# Patient Record
Sex: MALE | Race: WHITE | NOT HISPANIC OR LATINO | URBAN - METROPOLITAN AREA
[De-identification: names, ages, dates, MRNs, and addresses within clinical notes are randomized per-mention and may not be internally consistent; named-entity substitution may affect disease eponyms.]

---

## 2024-10-01 ENCOUNTER — EMERGENCY (EMERGENCY)
Facility: HOSPITAL | Age: 39
LOS: 1 days | Discharge: HOME | End: 2024-10-01
Attending: EMERGENCY MEDICINE | Admitting: PERSONAL EMERGENCY RESPONSE ATTENDANT
Payer: MEDICAID

## 2024-10-01 VITALS
HEART RATE: 90 BPM | RESPIRATION RATE: 17 BRPM | SYSTOLIC BLOOD PRESSURE: 122 MMHG | TEMPERATURE: 98 F | OXYGEN SATURATION: 100 % | DIASTOLIC BLOOD PRESSURE: 84 MMHG

## 2024-10-01 VITALS — WEIGHT: 163.14 LBS

## 2024-10-01 PROCEDURE — 99284 EMERGENCY DEPT VISIT MOD MDM: CPT

## 2024-10-01 RX ORDER — RABIES IMMUNE GLOBULIN (HUMAN) 300 [IU]/ML
1500 INJECTION, SOLUTION INFILTRATION; INTRAMUSCULAR ONCE
Refills: 0 | Status: ACTIVE | OUTPATIENT
Start: 2024-10-01 | End: 2024-10-01

## 2024-10-01 RX ORDER — TETANUS TOXOID, REDUCED DIPHTHERIA TOXOID AND ACELLULAR PERTUSSIS VACCINE, ADSORBED 5; 2.5; 8; 8; 2.5 [IU]/.5ML; [IU]/.5ML; UG/.5ML; UG/.5ML; UG/.5ML
0.5 SUSPENSION INTRAMUSCULAR ONCE
Refills: 0 | Status: COMPLETED | OUTPATIENT
Start: 2024-10-01 | End: 2024-10-01

## 2024-10-01 RX ORDER — RABIES VIRUS STRAIN PM-1503-3M ANTIGEN (PROPIOLACTONE INACTIVATED) AND WATER 2.5 UNIT
1 KIT INTRAMUSCULAR ONCE
Refills: 0 | Status: DISCONTINUED | OUTPATIENT
Start: 2024-10-01 | End: 2024-10-01

## 2024-10-01 RX ORDER — RABIES IMMUNE GLOBULIN (HUMAN) 300 [IU]/ML
1500 INJECTION, SOLUTION INFILTRATION; INTRAMUSCULAR ONCE
Refills: 0 | Status: DISCONTINUED | OUTPATIENT
Start: 2024-10-01 | End: 2024-10-01

## 2024-10-01 RX ORDER — RABIES VIRUS STRAIN PM-1503-3M ANTIGEN (PROPIOLACTONE INACTIVATED) AND WATER 2.5 UNIT
1 KIT INTRAMUSCULAR ONCE
Refills: 0 | Status: COMPLETED | OUTPATIENT
Start: 2024-10-01 | End: 2024-10-01

## 2024-10-01 RX ADMIN — TETANUS TOXOID, REDUCED DIPHTHERIA TOXOID AND ACELLULAR PERTUSSIS VACCINE, ADSORBED 0.5 MILLILITER(S): 5; 2.5; 8; 8; 2.5 SUSPENSION INTRAMUSCULAR at 12:32

## 2024-10-01 RX ADMIN — RABIES VIRUS STRAIN PM-1503-3M ANTIGEN (PROPIOLACTONE INACTIVATED) AND WATER 1 MILLILITER(S): KIT at 12:31

## 2024-10-01 NOTE — ED PROVIDER NOTE - PHYSICAL EXAMINATION
VITAL SIGNS: I have reviewed nursing notes and confirm.  CONSTITUTIONAL:  in no acute distress.  SKIN: Skin exam is warm and dry, no acute rash.  RESP: No wheezes,  no rales or rhonchi.   EXT: (+) Superficial bite to R fifth digit, intact radial and ulnar pulses. <2s capillary refill. Strength and sensation intact with full ROM. No nail bed injury. No signs of infection, active drainage or bleeding.

## 2024-10-01 NOTE — ED ADULT NURSE NOTE - OBJECTIVE STATEMENT
Pt received to intake 16    , awake and alert, A&OX4, ambulatory. C/o dog bite to the right pinky. pt pinky noted to have small bite marks , no swelling or redness.  Respirations even and unlabored. Denies CP, SOB, N/V, HA, dizziness, palpitations, blurry vision. Bed in lowest position, call bell within reach. Safety maintained.

## 2024-10-01 NOTE — ED PROVIDER NOTE - OBJECTIVE STATEMENT
Patient is a 38y Male w/ PMHx of cholecystectomy & recent arrival from [place] 3mos ago who presents to ED w/ complaints of dog bite. Patient stated on Sunday he approached a stray dog, unknown origin, to pet it when it bit his R fifth digit (pinky). Endorses full ROM. Denies fever, chills, pain at site of injury, pus drainage bleeding, numbness/tingling.

## 2024-10-01 NOTE — ED PROVIDER NOTE - NSFOLLOWUPINSTRUCTIONS_ED_ALL_ED_FT
You were seen in the emergency department for animal bite . We have evaluated you and determined that you do not require further hospital interventions.    Please follow up with your primary care provider as necessary to discuss the results of your stay in our department.    If you start to experience worsening symptoms such as fever, chills, pain at site of bite , please return to the emergency department for further evaluation.    Animal Bite, Adult  Animal bites can be mild or serious. Small bites from house pets normally are mild. Bites from cats, strays, or wild animals can be serious. If a stray or wild animal bites you, you need to get medical help right away. You may also need a shot to prevent rabies infection.    What increases the risk?  Being near pets you do not know.  Being near animals that are eating, sleeping, or caring for their babies.  Being outside where small, wild animals move freely.  What are the signs or symptoms?  Pain.  Bleeding.  Swelling.  Bruising.  How is this treated?  Treatment may include:  Cleaning your wound.  Rinsing out (flushing) your wound. This uses saline solution, which is made of salt and water.  Putting a bandage on your wound.  Closing your wound with stitches (sutures), staples, skin glue, or skin tape (adhesive strips).  Antibiotic medicine. You may be given pills, cream, gel, or fluid through an IV.  A tetanus shot.  Rabies treatment, if the animal could have rabies.  Surgery, if there is infection or damage that needs to be fixed.  Follow these instructions at home:  Medicines    Take or apply over-the-counter and prescription medicines only as told by your doctor.  If you were prescribed an antibiotic medicine, take or apply it as told by your doctor. Do not stop using it even if your wound gets better.  Wound care    Two stitched wounds. One is normal. The other is red with pus and infected.  Follow instructions from your doctor about how to take care of your wound. Make sure you:  Wash your hands with soap and water for at least 20 seconds before and after you change your bandage. If you cannot use soap and water, use hand .  Change your bandage.  Leave stitches or skin glue in place for at least 2 weeks.  Leave tape strips alone unless you are told to take them off. You may trim the edges of the tape strips if they curl up.  Check your wound every day for signs of infection. Check for:  More redness, swelling, or pain.  More fluid or blood.  Warmth.  Pus or a bad smell.  General instructions    Bag of ice on a towel on the skin.   Raise (elevate) the injured area above the level of your heart while you are sitting or lying down.  If told, put ice on the injured area. To do this:  Put ice in a plastic bag.  Place a towel between your skin and the bag.  Leave the ice on for 20 minutes, 2–3 times per day.  Take off the ice if your skin turns bright red. This is very important. If you cannot feel pain, heat, or cold, you have a greater risk of damage to the area.  Keep all follow-up visits.  Contact a doctor if:  You have more redness, swelling, or pain around your wound.  Your wound feels warm to the touch.  You have a fever or chills.  You have a general feeling of sickness (malaise).  You feel like you may vomit.  You vomit.  You have pain that does not get better.  Get help right away if:  You have a red streak going away from your wound.  You have any of these coming from your wound:  Non-clear fluid.  More blood.  Pus or a bad smell.  You have trouble moving your injured area.  You lose feeling (have numbness) or feel tingling anywhere on your body.  Summary  Animal bites can be mild or serious.  If a stray or wild animal bites you, you need to get medical help right away.  Your doctor will look at the wound and may ask about how the animal bite happened.  Treatment may include wound care, antibiotic medicine, a tetanus shot, and rabies treatment.  This information is not intended to replace advice given to you by your health care provider. Make sure you discuss any questions you have with your health care provider.

## 2024-10-01 NOTE — ED PROVIDER NOTE - PROGRESS NOTE DETAILS
[place]ter, DO PGY-1  Patient comfortable, requesting abx. Will send Augmentin to pharmacy. Injected rabies immunoglobulin at site of bite (R fifth digit). Tolerated well. [place] Trotter, DO PGY-1  Patient comfortable, requesting abx. Will send Augmentin to pharmacy- called in to [facility] [phone] [facility] (unable to send electronically). . Injected rabies immunoglobulin at site of bite (R fifth digit). Tolerated well.

## 2024-10-01 NOTE — ED ADULT NURSE NOTE - CHIEF COMPLAINT QUOTE
Pt got bit by a stray dog two days ago to the right pinky finger. Pt states he washed the site with soap and water.  Wound noted to be healing well with no swelling, warmth or tenderness. Pt states he needs a rabies shot. Denies a medical history and use of medications.

## 2024-10-01 NOTE — ED PROVIDER NOTE - CLINICAL SUMMARY MEDICAL DECISION MAKING FREE TEXT BOX
Patient is a 38y Male w/ PMHx of cholecystectomy & recent arrival from [place] 3mos ago who presents to ED w/ complaints of dog bite. Patient stated on Sunday he approached a stray dog, unknown origin, to pet it when it bit his R fifth digit (pinky). Endorses full ROM. Denies fever, chills, pain at site of injury, pus drainage bleeding, numbness/tingling.   PE remarkable for well appearing patient in no acute distress. (+) Superficial bite to R fifth digit, intact radial and ulnar pulses. <2s capillary refill. Strength and sensation intact with full ROM. No nail bed injury. No signs of infection, active drainage or bleeding.     Will order Rabies vaccine, rabies immunoglobulin, and Tdap vaccine. 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 Patient stated on Sunday he approached a stray dog, unknown origin, to pet it when it bit his R fifth digit (pinky). Endorses full ROM. Denies fever, chills, pain at site of injury, pus drainage bleeding, numbness/tingling.   PE remarkable for well appearing patient in no acute distress. (+) Superficial bite to R fifth digit, intact radial and ulnar pulses. <2s capillary refill. Strength and sensation intact with full ROM. No nail bed injury. No signs of infection, active drainage or bleeding.     Will order Rabies vaccine, rabies immunoglobulin, and Tdap vaccine.

## 2024-10-01 NOTE — ED PROVIDER NOTE - PATIENT PORTAL LINK FT
You can access the FollowMyHealth Patient Portal offered by [facility] by registering at the following website: [email or web address]. By joining [employer]’s FollowMyHealth portal, you will also be able to view your health information using other applications (apps) compatible with our system.

## 2024-10-04 ENCOUNTER — EMERGENCY (EMERGENCY)
Facility: HOSPITAL | Age: 39
LOS: 1 days | Discharge: HOME | End: 2024-10-04
Attending: EMERGENCY MEDICINE | Admitting: EMERGENCY MEDICINE
Payer: MEDICAID

## 2024-10-04 VITALS
OXYGEN SATURATION: 98 % | HEIGHT: 68.5 IN | WEIGHT: 163.14 LBS | HEART RATE: 102 BPM | TEMPERATURE: 98 F | RESPIRATION RATE: 18 BRPM | DIASTOLIC BLOOD PRESSURE: 65 MMHG | SYSTOLIC BLOOD PRESSURE: 107 MMHG

## 2024-10-04 PROCEDURE — L9995: CPT

## 2024-10-04 RX ORDER — RABIES VACC, HUMAN DIPLOID/PF 2.5 UNIT
1 VIAL (EA) INTRAMUSCULAR ONCE
Refills: 0 | Status: COMPLETED | OUTPATIENT
Start: 2024-10-04 | End: 2024-10-04

## 2024-10-04 RX ADMIN — Medication 1 MILLILITER(S): at 10:58

## 2024-10-04 NOTE — ED PROVIDER NOTE - OBJECTIVE STATEMENT
38-year-old male with no significant past medical history.  Patient presents ED after dog bite on right hand 3 days ago.  Patient was petting a stray dog when it bit him.  Patient has no history of rabies immunization and is unsure of dog's vaccination status.  Dog was not able to be caught.  Patient came to the ED and had immunoglobulin and vaccine and antibiotics.  Patient is currently still taking the antibiotic.  Patient notes that wound is healing well.  Patient is here for second dose of rabies vaccine.  Patient denies any pus redness swelling or pain at the injury site.

## 2024-10-04 NOTE — ED PROVIDER NOTE - PHYSICAL EXAMINATION
Right hand fifth digit with healing wound on the ulnar side of proximal phalanx.  Patient has full range of motion and no tenderness or erythema.

## 2024-10-04 NOTE — ED PROVIDER NOTE - CLINICAL SUMMARY MEDICAL DECISION MAKING FREE TEXT BOX
38-year-old male 4 days status post dog bite.  Patient here for rabies vaccine #2 will need to return for day 7 and day 14 dose.  Wound appears to be healing well.

## 2024-10-04 NOTE — ED PROVIDER NOTE - PATIENT PORTAL LINK FT
You can access the FollowMyHealth Patient Portal offered by [facility] by registering at the following website: [email or web address]. By joining [employer]’s FollowMyHealth portal, you will also be able to view your health information using other applications (apps) compatible with our system.

## 2024-10-04 NOTE — ED PROVIDER NOTE - NSFOLLOWUPINSTRUCTIONS_ED_ALL_ED_FT
Follow up with your PMD within 48-72 hrs.  Take all of your medications as previously prescribed.  Worsening, continued or ANY new concerning symptoms return to the Emergency Department.     Return to Emergency Dept. for additional Rabies Vaccines on day: 7 (10/8),14 (10/15)    Rabies Vaccine: What You Need to Know  1. Why get vaccinated?  Rabies vaccine can prevent rabies.  Rabies is mainly a disease of animals. Humans get rabies when they are bitten or scratched by infected animals.  Human rabies is rare in the [place]. Wild animals like bats, raccoons, skunks, and foxes are the most common source of human rabies infection in the [place].Rabies is more common in other parts of the world where dogs still carry rabies. Most rabies deaths in people around the world are caused by bites from unvaccinated dogs.Rabies infects the central nervous system. After infection with rabies, at first there might not be any symptoms. Weeks or even months after a bite, rabies can cause general weakness or discomfort, fever, or headache. As the disease progresses, the person may experience delirium, abnormal behavior, hallucinations, hydrophobia (fear of water), and insomnia.  If a person does not receive appropriate medical care after an exposure, human rabies is almost always fatal.  Rabies can be prevented by vaccinating pets, staying away from wildlife, and seeking medical care after potential exposures and before symptoms start.  2. Rabies vaccine  Rabies vaccine is given to people at high risk of rabies to protect them if they are exposed. People at high risk of exposure to rabies should be offered pre-exposure rabies vaccination, including:  Veterinarians, animal handlers, and veterinary students Rabies laboratory workers [employer] (people who explore caves), and Persons who work with live vaccine to produce rabies vaccine and rabies immune globulin.Pre-exposure rabies vaccination should also be considered for:  People whose activities bring them into frequent contact with rabies virus or with possibly rabid animals. International travelers who are likely to come in contact with animals in parts of the world where rabies is common and immediate access to appropriate care is limited.For pre-exposure protection, 3 doses of rabies vaccine are recommended. People who may be repeatedly exposed to rabies virus should receive periodic testing for immunity, and booster doses might be necessary. Your health care provider can give you more details.  Rabies vaccine can prevent rabies if given to a person after they have had an exposure. Anyone who has been bitten by an animal suspected to have rabies, or who otherwise may have been exposed to rabies, should clean the wound and see a health care provider immediately regardless of vaccination status. The health care provider can help determine if the person should receive post-exposure rabies vaccination.  For post-exposure protection:  A person who is exposed and has never been vaccinated against rabies should get 4 doses of rabies vaccine. The person should also get another shot called rabies immune globulin (RIG). A person who has been previously vaccinated should get 2 doses of rabies vaccine and does not need Rabies Immune Globulin.Your health care provider can give you more information.  3. Talk with your health care provider  Tell your vaccine provider if the person getting the vaccine:   Has had an allergic reaction after a previous dose of rabies vaccine, or has any severe, life-threatening allergies.Has a weakened immune system.In some cases, your health care provider may decide to postpone a routine (non-exposure) dose of rabies vaccination to a future visit.   People with minor illnesses, such as a cold, may be vaccinated. People who are moderately or severely ill should usually wait until they recover before getting a routine (non-exposure) dose of rabies vaccine. If you have been exposed to rabies virus, you should get vaccinated regardless of concurrent illnesses, pregnancy, or breastfeeding.  Your health care provider can give you more information.  4. Risks of a vaccine reaction  Soreness, redness, swelling, or itching at the site of the injection, and headache, nausea, abdominal pain, muscle aches, or dizziness can happen after rabies vaccine. Hives, pain in the joints, or fever sometimes happen after booster doses. Very rarely, nervous system disorders such as Guillain–Barré syndrome (GBS) have been reported after rabies vaccine.People sometimes faint after medical procedures, including vaccination. Tell your provider if you feel dizzy or have vision changes or ringing in the ears.  As with any medicine, there is a very remote chance of a vaccine causing a severe allergic reaction, other serious injury, or death.  5. What if there is a serious problem?  An allergic reaction could occur after the vaccinated person leaves the clinic. If you see signs of a severe allergic reaction (hives, swelling of the face and throat, difficulty breathing, a fast heartbeat, dizziness, or weakness), call 9-1-1 and get the person to the nearest hospital.  For other signs that concern you, call your health care provider.   Adverse reactions should be reported to the Vaccine Adverse Event Reporting System (VAERS). Your health care provider will usually file this report, or you can do it yourself. Visit the VAERS website at www.vaers.[facility].gov or call 1-791.853.6862. VAERS is only for reporting reactions, and VAERS staff do not give medical advice.  6. How can I learn more?  Ask your health care provider. Call your local or state health department. Contact the Centers for Disease Control and Prevention (CDC):  Call 1-755.159.3908 (0-076-QPD-INFO) orVisit CDC's rabies website at www.cdc.gov/rabiesVaccine Information Statement Rabies Vaccine (01/08/2020)  This information is not intended to replace advice given to you by your health care provider. Make sure you discuss any questions you have with your health care provider.

## 2024-10-04 NOTE — ED ADULT NURSE NOTE - OBJECTIVE STATEMENT
pt arrives to intake for 2nd rabies vaccine. pt is AxO 3 and ambulatory. pt respirations even and unlabored. pt denies headaches, dizziness, n/v/d, abdominal pain, SOB, chest pain, or fever like symptoms. pt medicated as prescribed. Pt refusing to stay for post vaccine monitoring. D/C paper work given by MD zeng.

## 2024-10-08 ENCOUNTER — EMERGENCY (EMERGENCY)
Facility: HOSPITAL | Age: 39
LOS: 1 days | Discharge: HOME | End: 2024-10-08
Attending: EMERGENCY MEDICINE | Admitting: EMERGENCY MEDICINE
Payer: MEDICAID

## 2024-10-08 VITALS
TEMPERATURE: 98 F | HEIGHT: 68.5 IN | SYSTOLIC BLOOD PRESSURE: 109 MMHG | DIASTOLIC BLOOD PRESSURE: 73 MMHG | RESPIRATION RATE: 18 BRPM | WEIGHT: 163.14 LBS | HEART RATE: 72 BPM | OXYGEN SATURATION: 98 %

## 2024-10-08 PROCEDURE — L9995: CPT

## 2024-10-08 RX ORDER — RABIES VACC, HUMAN DIPLOID/PF 2.5 UNIT
1 VIAL (EA) INTRAMUSCULAR ONCE
Refills: 0 | Status: COMPLETED | OUTPATIENT
Start: 2024-10-08 | End: 2024-10-08

## 2024-10-08 RX ADMIN — Medication 1 MILLILITER(S): at 19:37

## 2024-10-08 NOTE — ED PROVIDER NOTE - CLINICAL SUMMARY MEDICAL DECISION MAKING FREE TEXT BOX
Patient here to complete series post d/c course uneventful previous note reviewed will d/c to complete series

## 2024-10-08 NOTE — ED PROVIDER NOTE - NSFOLLOWUPINSTRUCTIONS_ED_ALL_ED_FT
Rabies Vaccine    AMBULATORY CARE:    After exposure to the virus, the vaccine is usually given in 2 or 4 doses:  A person who has not already had the vaccine will usually get 4 doses. The first dose is given as soon after exposure to rabies as possible. A shot called rabies immune globulin is given at the same time as the first dose. This medicine helps your immune system fight the infection.     The other doses are given on     Day  3: __________________________    Day 7: __________________________    Day 14: _________________________    You may also need a dose 28 days ____________________________________after the exposure if you have a weak immune system.   BRING THIS DISCHARGE SHEET WITH YOU TO KEEP TRACK OF YOUR VACCINATIONS    Your healthcare provider will tell you if you need 4 or 5 doses.    The rabies vaccine an injection given to help prevent rabies. Rabies is a disease that affects the body's central nervous system (brain, spinal cord, and nerves). Rabies is caused by a virus. The rabies virus is spread to humans through the bite of an infected animal. Dogs, bats, skunks, coyotes, raccoons, and foxes are examples of animals that can carry rabies. The rabies vaccine can protect you from being infected with the virus. The vaccine can also prevent you from developing rabies even if you get it after you were bitten by an infected animal.    When the vaccine is given: Your healthcare provider will tell you how many doses of the vaccine you need. He or she will give you an injection schedule. Plan to get all of the doses on the days they are scheduled, especially the first 2 doses. Do not put off getting the injections or try to schedule them all for the same day. Tell your provider if you think anything may keep you from getting all the doses as scheduled. He or she may be able to help you find ways to stay on schedule. The following is a common dosing schedule:    Before exposure to the virus, the vaccine is given in 3 doses. The first dose can be given at any time. The second dose is given 7 days after the first. The third dose is given 21 or 28 days after the first. Plan to get all of the doses 3 to 4 weeks before you travel.    A person who has already had the vaccine will get 2 doses. The first is given immediately, and the second is given on day 3 after the exposure. Rabies immune globulin is not given.    Booster doses may be needed over time if you stay at high risk for rabies. You are at increased risk for rabies if:  Your work involves handling animals that can carry rabies.    You work in a rabies laboratory.    You often go into caves where bats live.    You often travel to a country where rabies is common.  If you miss a dose or will miss a scheduled dose: Call your healthcare provider right away. He or she will tell you what to do. The best way to be protected is to stay on the injection schedule given to you. This is especially important if you are getting the vaccine after exposure to the rabies virus. Reschedule any makeup dose or upcoming dose for as close to the original appointment as possible. Remember that you cannot get more than 1 dose on any day.    Reasons not to get the rabies vaccine, or to wait to get it:    Tell your healthcare provider if you had a severe allergic reaction to the rabies vaccine or to another vaccine. If you are getting the vaccine before exposure, do not get another dose. After exposure, you need to get all the doses even if you are at risk for an allergic reaction. Your healthcare provider may need to take extra precautions before you get another dose.    Tell your healthcare provider about all of your allergies. Also tell him or her if you have a disease that affects your immune system (such as HIV/AIDS) or you have cancer. Tell him or her if you are taking medicines that affect your immune system or any cancer treatment drug or radiation. Tell him or her if you are ill. You may need to wait to get the vaccine until you feel better.  Risks of the rabies vaccine: You may have a severe allergic reaction. The area where you got the shot may become red, swollen, or painful. You may develop a headache or muscle aches. You may have nausea or pain in your abdomen. You may develop rabies even after you get the vaccine.    Call your local emergency number (911 in the [place]) or have someone else call if:    Your mouth and throat are swollen.    You are wheezing or have trouble breathing.    You have chest pain or your heart is beating faster than normal for you.    You feel like you are going to faint.  Seek care immediately if:    Your face is red or swollen.    You have hives that spread over your body.    You feel weak or dizzy.  Call your doctor if:    You have increased pain, redness, or swelling around the area where the shot was given.    You have questions or concerns about the rabies vaccine.  Apply a warm compress to the injection area as directed to decrease pain and swelling.    Follow up with your doctor as directed: Your doctor will need to check your blood regularly to make sure the vaccine is working. Write down your questions so you remember to ask them during your visits.    Rabies WHAT YOU NEED TO KNOW:    Rabies is a disease that affects the body's central nervous system (brain, spinal cord, and nerves). Rabies is caused by a virus. You may get the virus if you come into contact with the saliva or other tissue of an infected animal. Rabies infection usually happens through a bite wound. Animals that may spread rabies include dogs, cats, coyotes, raccoons, foxes, skunks, and bats. Rabies develops when the virus enters the skin and goes to the muscles or nerves.    DISCHARGE INSTRUCTIONS:    Call your local emergency number (911 in the [place]) or have someone else call if:    You have trouble swallowing or slurred speech.    You have double vision, or you see things that are not really there.    You begin twitching, have muscle cramps, or have a seizure.  Seek care immediately if:    You think you were exposed to rabies.    You were bitten by an animal. Clean the wound as soon after the bite as possible.    You feel weak, tired, dizzy, confused, restless, or anxious.  Call your doctor if:    You have a fever.    Your signs and symptoms do not get better after treatment.    You have questions or concerns about rabies and rabies treatment.  Medicines: You may need any of the following:    Medicines such as the rabies vaccine or immune globulin may be given. These medicines help your body fight the virus and prevent rabies. Medicines may be given to help control seizures, treat a viral infection, or decrease inflammation.    Prescription pain medicine may be given. Ask your healthcare provider how to take this medicine safely. Some prescription pain medicines contain acetaminophen. Do not take other medicines that contain acetaminophen without talking to your healthcare provider. Too much acetaminophen may cause liver damage. Prescription pain medicine may cause constipation. Ask your healthcare provider how to prevent or treat constipation.    Take your medicine as directed. Contact your healthcare provider if you think your medicine is not helping or if you have side effects. Tell him or her if you are allergic to any medicine. Keep a list of the medicines, vitamins, and herbs you take. Include the amounts, and when and why you take them. Bring the list or the pill bottles to follow-up visits. Carry your medicine list with you in case of an emergency.  If an animal that can carry rabies bites you:    Clean the bite wound. Clean the bite wound for at least 5 minutes. Use soap and water, or povidone-iodine solution mixed with water. Do this right after you are bitten to lower the risks for a wound infection and rabies. Cover the wound with a clean bandage to prevent infection.    Seek care right away. Healthcare providers may need to treat the wound and close it with stitches. You may need to take antibiotics to help fight or treat a bacterial infection. The rabies vaccine series may be started immediately.  Prevent rabies:    Ask your healthcare provider about the rabies vaccine. You may need the vaccine if your risk for rabies is increased through work or travel. You will be given 3 doses. Get all doses 3 to 4 weeks before you travel to a place where the risk for rabies is high.    Avoid contact with animals. Do not approach any wild animal, or any tame animal that you do not know. Cover windows and other openings in your home with screens so wild animals cannot get inside.    Get medical care if you get bitten by an animal. Do this even if the wound is very small.    Get your pet vaccinated against rabies. You will need to do this every 3 years or as directed by your .  Follow up with your doctor as directed: Write down your questions so you remember to ask them during your visits.

## 2024-10-08 NOTE — ED ADULT NURSE NOTE - OBJECTIVE STATEMENT
38 year old male, received to wellness. A&Ox4, ambulatory. Respirations equal and unlabored. Pt requesting 3rd dose of rabies vaccination. Pt denies fevers, chills, any other medical complaints. No acute distress noted. Safety maintained.

## 2024-10-08 NOTE — ED PROVIDER NOTE - PATIENT PORTAL LINK FT
You can access the FollowMyHealth Patient Portal offered by [facility] by registering at the following website: [email or web address]. By joining [employer]’s FollowMyHealth portal, you will also be able to view your health information using other applications (apps) compatible with our system.

## 2024-10-09 PROBLEM — Z78.9 OTHER SPECIFIED HEALTH STATUS: Chronic | Status: ACTIVE | Noted: 2024-10-04

## 2024-10-15 ENCOUNTER — EMERGENCY (EMERGENCY)
Facility: HOSPITAL | Age: 39
LOS: 1 days | Discharge: HOME | End: 2024-10-15
Attending: STUDENT IN AN ORGANIZED HEALTH CARE EDUCATION/TRAINING PROGRAM | Admitting: STUDENT IN AN ORGANIZED HEALTH CARE EDUCATION/TRAINING PROGRAM
Payer: MEDICAID

## 2024-10-15 VITALS
WEIGHT: 163.14 LBS | TEMPERATURE: 98 F | OXYGEN SATURATION: 100 % | DIASTOLIC BLOOD PRESSURE: 75 MMHG | RESPIRATION RATE: 18 BRPM | HEART RATE: 77 BPM | SYSTOLIC BLOOD PRESSURE: 120 MMHG | HEIGHT: 68.5 IN

## 2024-10-15 PROCEDURE — L9995: CPT

## 2024-10-15 RX ORDER — RABIES VACC, HUMAN DIPLOID/PF 2.5 UNIT
1 VIAL (EA) INTRAMUSCULAR ONCE
Refills: 0 | Status: COMPLETED | OUTPATIENT
Start: 2024-10-15 | End: 2024-10-15

## 2024-10-15 RX ADMIN — Medication 1 MILLILITER(S): at 10:31

## 2024-10-15 NOTE — ED PROVIDER NOTE - OBJECTIVE STATEMENT
38-year-old male with no significant past medical history.  Patient presents ED for Day 14 rabies vaccine, afert dog bit on Oct 1.  Patient was petting a stray dog when it bit him.  Patient has no history of rabies immunization and is unsure of dog's vaccination status.  Dog was not able to be caught.  Patient came to the ED and had immunoglobulin and vaccine and antibiotics.  Patient is currently still taking the antibiotic.  Patient notes that wound is healing well.  Patient is here for 4th dose of rabies vaccine.  Patient denies any pus redness swelling or pain at the injury site.

## 2024-10-15 NOTE — ED PROVIDER NOTE - PATIENT PORTAL LINK FT
You can access the FollowMyHealth Patient Portal offered by [facility] by registering at the following website: [email or web address]. By joining [employer]’s FollowMyHealth portal, you will also be able to view your health information using other applications (apps) compatible with our system.

## 2024-10-15 NOTE — ED PROVIDER NOTE - PHYSICAL EXAMINATION
Gen:  Well appearning in NAD  Head:  NC/AT  Resp: No distress   Ext: no deformities, right 5th digit, well healed, no signs of infection  Skin: warm and dry as visualized

## 2024-10-15 NOTE — ED PROVIDER NOTE - NSFOLLOWUPINSTRUCTIONS_ED_ALL_ED_FT
Log Out.  [employer] CareNotes®  :  [facility]        RABIES VACCINE - General Information    Rabies Vaccine    WHAT YOU NEED TO KNOW:    What is the rabies vaccine? The rabies vaccine can prevent rabies. Rabies is a serious illness caused by a virus. The rabies virus is spread to humans through the bite or scratch of an infected animal. Dogs, bats, skunks, coyotes, raccoons, and foxes are examples of animals that can carry rabies. The rabies vaccine can protect you from infection if you are at high risk of exposure. The vaccine can also prevent infection after you are bitten by an infected animal.    When is the vaccine given? The rabies vaccine is not part of the usual vaccination schedule. Your healthcare provider will give you an injection schedule. You should receive a vaccine if you have a higher risk of exposure to rabies. This might include people who work with animals who may be infected with rabies. You should also receive the vaccine after being bitten or scratched by an infected animal. The following is a common dosing schedule:    Before possible exposure to the virus, the vaccine is given in 2 doses. The first dose can be given at any time. The second dose is given 7 days after the first. You may need a booster dose within 3 years of the first 2 doses.    After exposure to the virus, the vaccine is usually given in 2 or 4 doses:  If you have received the rabies vaccine in the past, you usually only need 2 doses. The first is given immediately and the second is given 3 days later.    If you have not received the rabies vaccine, you need 4 doses over 2 weeks. The first dose is given as soon as possible after exposure to rabies. The following doses are given on days 3, 7, and 14. A shot called rabies immune globulin is given at the same time as the first dose. This medicine helps your immune system fight the infection.  What should I do if I miss a dose or will miss a scheduled dose? Call your healthcare provider right away. He or she will tell you what to do. The best way to be protected is to stay on the injection schedule given to you. This is especially important if you are getting the vaccine after exposure to the rabies virus. Reschedule any makeup dose or upcoming dose for as close to the original appointment as possible. Remember that you cannot get more than 1 dose on any day.    Who should not get the rabies vaccine or should wait to get it? Your healthcare provider may have you wait if you have not been exposed to rabies but are at high risk. If you have been exposed, you need to get the vaccine as soon as possible. Tell the provider if:    You had an allergic reaction to the rabies vaccine in the past, or to another vaccine.    You have any allergies.    You have a weakened immune system.    You take chloroquine or a similar medicine.    You are sick or have a fever of 101°F (38.3°C) or higher.  What are the risks of the rabies vaccine? The injection area may become red, tender, or swollen. You may develop a headache or muscle aches. You may have nausea or pain in your abdomen. You may have an allergic reaction to the vaccine. This can be life-threatening.    Call your local emergency number (911 in the ) or have someone call if:    Your mouth and throat are swollen.    You are wheezing or have trouble breathing.    You have chest pain or your heart is beating faster than normal for you.    You feel like you are going to faint.  When should I seek immediate care?    Your face is red or swollen.    You have hives that spread over your body.    You feel weak or dizzy.  When should I call my doctor?    You have increased pain, redness, or swelling around the injection area.    You have a headache, muscle aches, or abdominal pain.    You have flu-like symptoms.    You have questions or concerns about the rabies vaccine.  CARE AGREEMENT:    You have the right to help plan your care. Learn about your health condition and how it may be treated. Discuss treatment options with your healthcare providers to decide what care you want to receive. You always have the right to refuse treatment.    © [employer] 1973, 2024    	  back to top            © [employer] 1973, 2024 1. TAKE ALL MEDICATIONS AS DIRECTED.    2. FOR PAIN OR FEVER YOU CAN TAKE IBUPROFEN (MOTRIN, ADVIL) OR ACETAMINOPHEN (TYLENOL) AS NEEDED, AS DIRECTED ON PACKAGING.  3. FOLLOW UP WITH YOUR PRIMARY DOCTOR WITHIN 5 DAYS AS DIRECTED.  4. IF YOU HAD LABS OR IMAGING DONE, YOU WERE GIVEN COPIES OF ALL LABS AND/OR IMAGING RESULTS FROM YOUR ER VISIT--PLEASE TAKE THEM WITH YOU TO YOUR FOLLOW UP APPOINTMENTS.  5. IF NEEDED, CALL PATIENT ACCESS SERVICES AT [phone] TO FIND A PRIMARY CARE PHYSICIAN.  OR CALL [phone] TO MAKE AN APPOINTMENT WITH THE CLINIC.  6. RETURN TO THE ER FOR ANY WORSENING SYMPTOMS OR CONCERNS.        RABIES VACCINE - General Information    Rabies Vaccine    WHAT YOU NEED TO KNOW:    What is the rabies vaccine? The rabies vaccine can prevent rabies. Rabies is a serious illness caused by a virus. The rabies virus is spread to humans through the bite or scratch of an infected animal. Dogs, bats, skunks, coyotes, raccoons, and foxes are examples of animals that can carry rabies. The rabies vaccine can protect you from infection if you are at high risk of exposure. The vaccine can also prevent infection after you are bitten by an infected animal.    When is the vaccine given? The rabies vaccine is not part of the usual vaccination schedule. Your healthcare provider will give you an injection schedule. You should receive a vaccine if you have a higher risk of exposure to rabies. This might include people who work with animals who may be infected with rabies. You should also receive the vaccine after being bitten or scratched by an infected animal. The following is a common dosing schedule:    Before possible exposure to the virus, the vaccine is given in 2 doses. The first dose can be given at any time. The second dose is given 7 days after the first. You may need a booster dose within 3 years of the first 2 doses.    After exposure to the virus, the vaccine is usually given in 2 or 4 doses:  If you have received the rabies vaccine in the past, you usually only need 2 doses. The first is given immediately and the second is given 3 days later.    If you have not received the rabies vaccine, you need 4 doses over 2 weeks. The first dose is given as soon as possible after exposure to rabies. The following doses are given on days 3, 7, and 14. A shot called rabies immune globulin is given at the same time as the first dose. This medicine helps your immune system fight the infection.  What should I do if I miss a dose or will miss a scheduled dose? Call your healthcare provider right away. He or she will tell you what to do. The best way to be protected is to stay on the injection schedule given to you. This is especially important if you are getting the vaccine after exposure to the rabies virus. Reschedule any makeup dose or upcoming dose for as close to the original appointment as possible. Remember that you cannot get more than 1 dose on any day.    Who should not get the rabies vaccine or should wait to get it? Your healthcare provider may have you wait if you have not been exposed to rabies but are at high risk. If you have been exposed, you need to get the vaccine as soon as possible. Tell the provider if:    You had an allergic reaction to the rabies vaccine in the past, or to another vaccine.    You have any allergies.    You have a weakened immune system.    You take chloroquine or a similar medicine.    You are sick or have a fever of 101°F (38.3°C) or higher.  What are the risks of the rabies vaccine? The injection area may become red, tender, or swollen. You may develop a headache or muscle aches. You may have nausea or pain in your abdomen. You may have an allergic reaction to the vaccine. This can be life-threatening.    Call your local emergency number (911 in the [place]) or have someone call if:    Your mouth and throat are swollen.    You are wheezing or have trouble breathing.    You have chest pain or your heart is beating faster than normal for you.    You feel like you are going to faint.  When should I seek immediate care?    Your face is red or swollen.    You have hives that spread over your body.    You feel weak or dizzy.  When should I call my doctor?    You have increased pain, redness, or swelling around the injection area.    You have a headache, muscle aches, or abdominal pain.    You have flu-like symptoms.    You have questions or concerns about the rabies vaccine.  CARE AGREEMENT:    You have the right to help plan your care. Learn about your health condition and how it may be treated. Discuss treatment options with your healthcare providers to decide what care you want to receive. You always have the right to refuse treatment.    © [employer] 1973, 2024    	  back to top            © [employer]  [employer] 1973, 2024

## 2025-06-13 NOTE — ED PROVIDER NOTE - CHIEF COMPLAINT
[de-identified] : The patient has severe bilateral knee osteoarthritis. She has tried and failed a course of conservative management and would like to consider left total knee arthroplasty with robotic navigation assistance. After allowing a minimum of 3 months for healing she would then like to consider proceedign with a right total knee arthroplasty. In the meantime today we performed a right knee intraarticular cortisone injection.  Informed consent for the right knee injection was obtained. All risks, benefits and alternatives were discussed. These included but were not limited to bleeding, infection, and allergic reaction.  All questions were answered. The right knee was prepped and draped in sterile fashion. Using sterile technique, the right knee was injected with 80mg of Kenalog, 4cc of 1% lidocaine, 4cc of 0.25% marcaine using a 21-gauge needle. A sterile dressing was applied. Post injection instructions were reviewed. The patient tolerated the procedure well.  The patient is an appropriate candidate for consideration of left total knee replacement. An extensive discussion was conducted of the natural history of the disease and the variety of surgical and non-surgical treatment options available to the patient. A risk/benefit analysis was discussed with the patient reviewing the advantages and disadvantages of surgical intervention at this time. Both the level and length of the patients pain have made additional conservative treatment measures consisting of physical therapy, corticosteroids, and/or viscosupplementation contraindicated. A full explanation was given of the nature and the purpose of the procedure and anesthesia, its benefits, possible alternative methods of diagnosis or treatment, the risks involved, the possibility of complications, the foreseeable consequences of the procedure and the possible results of the non-treatment.  No guarantee or assurance was made as to the results that may be obtained. Specifically, the risks were identified to include, but are not limited to the following: Infection, phlebitis, pulmonary embolism, death, paralysis, dislocation, pain, stiffness, instability, limp, weakness, breakage, leg-length inequality, uncontrolled bleeding, nerve injury, blood vessel injury, pressure sores, anesthetic risks, delayed healing of wound and bone, and wear and loosening. Further discussion was undertaken with the patient about the details of surgical preparation, treatment, and postoperative rehabilitation including medical clearance, autotransfusion, the hospital course, and the postoperative rehabilitation involved. All in all, I feel that this patient is a good candidate for surgical reconstruction.  The patient and I discussed the option for 23 hour stay joint replacement. They will stay overnight in the hospital after surgery and will discharge home on the next day of surgery. The risks and benefits of this type of rapid recovery protocol was reviewed with the patient and they are in agreement with proceeding with 23 hour stay joint replacement surgery. They understand that in the event of an emergency, that they will be transferred to the main hospital for inpatient care.  The patient is a 38y Male complaining of